# Patient Record
Sex: FEMALE | Race: WHITE
[De-identification: names, ages, dates, MRNs, and addresses within clinical notes are randomized per-mention and may not be internally consistent; named-entity substitution may affect disease eponyms.]

---

## 2019-04-12 ENCOUNTER — HOSPITAL ENCOUNTER (EMERGENCY)
Dept: HOSPITAL 89 - ER | Age: 19
Discharge: HOME | End: 2019-04-12
Payer: COMMERCIAL

## 2019-04-12 VITALS — SYSTOLIC BLOOD PRESSURE: 106 MMHG | DIASTOLIC BLOOD PRESSURE: 63 MMHG

## 2019-04-12 DIAGNOSIS — B34.9: Primary | ICD-10-CM

## 2019-04-12 LAB — PLATELET COUNT, AUTOMATED: 220 K/UL (ref 150–450)

## 2019-04-12 PROCEDURE — 82247 BILIRUBIN TOTAL: CPT

## 2019-04-12 PROCEDURE — 96375 TX/PRO/DX INJ NEW DRUG ADDON: CPT

## 2019-04-12 PROCEDURE — 86308 HETEROPHILE ANTIBODY SCREEN: CPT

## 2019-04-12 PROCEDURE — 82947 ASSAY GLUCOSE BLOOD QUANT: CPT

## 2019-04-12 PROCEDURE — 84075 ASSAY ALKALINE PHOSPHATASE: CPT

## 2019-04-12 PROCEDURE — 71046 X-RAY EXAM CHEST 2 VIEWS: CPT

## 2019-04-12 PROCEDURE — 84460 ALANINE AMINO (ALT) (SGPT): CPT

## 2019-04-12 PROCEDURE — 82040 ASSAY OF SERUM ALBUMIN: CPT

## 2019-04-12 PROCEDURE — 81025 URINE PREGNANCY TEST: CPT

## 2019-04-12 PROCEDURE — 99284 EMERGENCY DEPT VISIT MOD MDM: CPT

## 2019-04-12 PROCEDURE — 84295 ASSAY OF SERUM SODIUM: CPT

## 2019-04-12 PROCEDURE — 82374 ASSAY BLOOD CARBON DIOXIDE: CPT

## 2019-04-12 PROCEDURE — 82565 ASSAY OF CREATININE: CPT

## 2019-04-12 PROCEDURE — 85025 COMPLETE CBC W/AUTO DIFF WBC: CPT

## 2019-04-12 PROCEDURE — 96374 THER/PROPH/DIAG INJ IV PUSH: CPT

## 2019-04-12 PROCEDURE — 84520 ASSAY OF UREA NITROGEN: CPT

## 2019-04-12 PROCEDURE — 82310 ASSAY OF CALCIUM: CPT

## 2019-04-12 PROCEDURE — 84132 ASSAY OF SERUM POTASSIUM: CPT

## 2019-04-12 PROCEDURE — 96361 HYDRATE IV INFUSION ADD-ON: CPT

## 2019-04-12 PROCEDURE — 84155 ASSAY OF PROTEIN SERUM: CPT

## 2019-04-12 PROCEDURE — 84450 TRANSFERASE (AST) (SGOT): CPT

## 2019-04-12 PROCEDURE — 82435 ASSAY OF BLOOD CHLORIDE: CPT

## 2019-04-12 NOTE — RADIOLOGY IMAGING REPORT
[place] [facility] [place] 

 

PATIENT NAME: Makayla Navarro

: 2000

MR: [id number]

V: [id number]

EXAM DATE: 

ORDERING PHYSICIAN: ARA TORRES

TECHNOLOGIST: 

 

Location: [facility]

Patient: Makayla Navarro

: 2000

MRN: [id number]

Visit/Account:[id number]

Date of Sevice:  2019

 

ACCESSION #: [id number]

 

2 VIEWS CHEST

 

INDICATION: Shortness of breath with flulike symptoms.

 

COMPARISON: None available

 

FINDINGS:

The lungs are clear. No effusion or pneumothorax is seen. Heart size and mediastinal contours are nor
mal.

 

IMPRESSION:

1. No radiographic evidence of active disease.

 

Report Dictated By: David Drake at 2019 7:57 AM

 

Report E-Signed By: David Drake  at 2019 7:59 AM

 

WSN:[id number]

## 2019-04-12 NOTE — ER REPORT
History and Physical


Time Seen By MD:  06:15


 (ARA TORRES DO)


HPI/ROS


CHIEF COMPLAINT: Fever, general malaise, dyspnea





HISTORY OF PRESENT ILLNESS: Patient is a 18-year-old female here with complaints


of fever, general malaise, diagnosis of otitis media on amoxicillin. Patient 


also complains of sore throat, pain with swallowing, sensation of swelling in 


the proximal airway with associated dyspnea. Patient reports having decreased 


appetite, nausea now with vomiting. Patient reports having a negative flu test 


recently though she is presumptively being treated with Tamiflu. This morning 


patient reported increased swelling and pain of the throat. Patient is febrile, 


tachycardic at time of evaluation.





REVIEW OF SYSTEMS:


Constitutional: + fever, + chills.


Eyes: No discharge.


ENT: + sore throat, + odynophagia, + bilateral otalgia. 


Cardiovascular: No chest pain, no palpitations.


Respiratory: No cough, + shortness of breath.


Gastrointestinal: No abdominal pain, + nausea/ vomiting.


Genitourinary: No hematuria.


Musculoskeletal: No back pain.


Skin: No rashes.


Neurological: + headache.


 (ARA TORRES DO)


Allergies:  


Coded Allergies:  


     No Known Drug Allergies (Unverified , 4/12/19)


Home Meds


Reported Medications


[Birth Control Oral]   No Conflict Check


   4/12/19


Constitutional





Vital Sign - Last 24 Hours








 4/12/19 4/12/19 4/12/19 4/12/19





 06:11 06:15 06:17 06:30


 


Temp   100.7 


 


Pulse ???  125 


 


Resp   18 


 


B/P (MAP)  127/91 (103) 127/91 114/81 (92)


 


Pulse Ox   94 


 


O2 Delivery   Room Air 


 


    





 4/12/19 4/12/19 4/12/19 4/12/19





 06:41 06:56 07:00 07:11


 


Pulse 110 ???  ???


 


B/P (MAP)   ???/??? (1665) 


 


Pulse Ox 95   





 4/12/19 4/12/19 4/12/19 





 07:26 07:30 07:41 


 


Pulse 106  112 


 


B/P (MAP)  113/65 (81)  


 


Pulse Ox 95  94 














Intake and Output   


 


 4/11/19 4/11/19 4/12/19





 15:00 23:00 07:00


 


Intake Total   1000 ml


 


Balance   1000 ml





 (JOSEFINA PATTON MD)


Physical Exam


   General Appearance: The patient is alert, has no immediate need for airway 


protection and no signs of toxicity. Uncomfortable appearing


Eyes: Pupils equal and round no pallor or injection.


ENT, Mouth: + Erythema of the posterior oropharynx, + tender cervical adenopathy


Respiratory: There are no retractions, lungs are clear to auscultation. No rales


or rhonchi


Cardiovascular: + Sinus tachycardia


Gastrointestinal:  Abdomen is soft and non tender, no masses, bowel sounds 


normal.


Neurological: No focal neurological deficits


Skin: Warm and dry, no rashes.


Musculoskeletal:  Neck is supple + cervical tender adenopathy.


      Extremities are nontender, nonswollen and have full range of motion.





DIFFERENTIAL DIAGNOSIS: After history and physical exam differential diagnosis 


was considered for adult fever including but not limited to viral syndromes i


ncluding influenza, mononucleosis, pneumonia and sepsis, otitis media


 (ARA TORRES DO)





Medical Decision Making


Data Points


Result Diagram:  


4/12/19 0640                                                                    


           4/12/19 0640





Laboratory





Hematology








Test


 4/12/19


06:40 4/12/19


06:55


 


Red Blood Count


 5.05 M/uL


(4.17-5.56) 





 


Mean Corpuscular Volume


 81.9 fL


(80.0-96.0) 





 


Mean Corpuscular Hemoglobin


 27.2 pg


(26.0-33.0) 





 


Mean Corpuscular Hemoglobin


Concent 33.2 g/dL


(32.0-36.0) 





 


Red Cell Distribution Width


 13.9 %


(11.5-14.5) 





 


Mean Platelet Volume


 8.3 fL


(7.2-11.1) 





 


Neutrophils (%) (Auto)


 80.3 %


(39.4-72.5) 





 


Lymphocytes (%) (Auto)


 9.9 %


(17.6-49.6) 





 


Monocytes (%) (Auto)


 8.5 %


(4.1-12.4) 





 


Eosinophils (%) (Auto)


 1.0 %


(0.4-6.7) 





 


Basophils (%) (Auto)


 0.3 %


(0.3-1.4) 





 


Nucleated RBC Relative Count


(auto) 0.0 /100WBC 


 





 


Neutrophils # (Auto)


 11.0 K/uL


(2.0-7.4) 





 


Lymphocytes # (Auto)


 1.4 K/uL


(1.3-3.6) 





 


Monocytes # (Auto)


 1.2 K/uL


(0.3-1.0) 





 


Eosinophils # (Auto)


 0.1 K/uL


(0.0-0.5) 





 


Basophils # (Auto)


 0.0 K/uL


(0.0-0.1) 





 


Nucleated RBC Absolute Count


(auto) 0.00 K/uL 


 





 


Sodium Level


 135 mmol/L


(137-145) 





 


Potassium Level


 3.1 mmol/L


(3.5-5.0) 





 


Chloride Level


 104 mmol/L


() 





 


Carbon Dioxide Level


 25 mmol/L


(22-31) 





 


Blood Urea Nitrogen 8 mg/dl (7-18)  


 


Creatinine


 0.70 mg/dl


(0.52-1.04) 





 


Glomerular Filtration Rate


Calc > 60.0 


 





 


Random Glucose


 119 mg/dl


() 





 


Calcium Level


 8.4 mg/dl


(8.4-10.2) 





 


Total Bilirubin


 0.3 mg/dl


(0.2-1.3) 





 


Aspartate Amino Transf


(AST/SGOT) 18 U/L (0-35) 


 





 


Alanine Aminotransferase


(ALT/SGPT) 27 U/L (0-56) 


 





 


Alkaline Phosphatase 60 U/L (0-126)  


 


Total Protein


 6.8 g/dl


(6.3-8.2) 





 


Albumin


 3.8 g/dl


(3.5-5.0) 





 


Monoscreen


 Negative


(NEGATIVE) 





 


Urine HCG, Qualitative


 


 Negative


(NEGATIVE)








Chemistry








Test


 4/12/19


06:40 4/12/19


06:55


 


White Blood Count


 13.6 k/uL


(4.5-11.0) 





 


Red Blood Count


 5.05 M/uL


(4.17-5.56) 





 


Hemoglobin


 13.7 g/dL


(12.0-16.0) 





 


Hematocrit


 41.4 %


(34.0-47.0) 





 


Mean Corpuscular Volume


 81.9 fL


(80.0-96.0) 





 


Mean Corpuscular Hemoglobin


 27.2 pg


(26.0-33.0) 





 


Mean Corpuscular Hemoglobin


Concent 33.2 g/dL


(32.0-36.0) 





 


Red Cell Distribution Width


 13.9 %


(11.5-14.5) 





 


Platelet Count


 220 K/uL


(150-450) 





 


Mean Platelet Volume


 8.3 fL


(7.2-11.1) 





 


Neutrophils (%) (Auto)


 80.3 %


(39.4-72.5) 





 


Lymphocytes (%) (Auto)


 9.9 %


(17.6-49.6) 





 


Monocytes (%) (Auto)


 8.5 %


(4.1-12.4) 





 


Eosinophils (%) (Auto)


 1.0 %


(0.4-6.7) 





 


Basophils (%) (Auto)


 0.3 %


(0.3-1.4) 





 


Nucleated RBC Relative Count


(auto) 0.0 /100WBC 


 





 


Neutrophils # (Auto)


 11.0 K/uL


(2.0-7.4) 





 


Lymphocytes # (Auto)


 1.4 K/uL


(1.3-3.6) 





 


Monocytes # (Auto)


 1.2 K/uL


(0.3-1.0) 





 


Eosinophils # (Auto)


 0.1 K/uL


(0.0-0.5) 





 


Basophils # (Auto)


 0.0 K/uL


(0.0-0.1) 





 


Nucleated RBC Absolute Count


(auto) 0.00 K/uL 


 





 


Glomerular Filtration Rate


Calc > 60.0 


 





 


Calcium Level


 8.4 mg/dl


(8.4-10.2) 





 


Total Bilirubin


 0.3 mg/dl


(0.2-1.3) 





 


Aspartate Amino Transf


(AST/SGOT) 18 U/L (0-35) 


 





 


Alanine Aminotransferase


(ALT/SGPT) 27 U/L (0-56) 


 





 


Alkaline Phosphatase 60 U/L (0-126)  


 


Total Protein


 6.8 g/dl


(6.3-8.2) 





 


Albumin


 3.8 g/dl


(3.5-5.0) 





 


Monoscreen


 Negative


(NEGATIVE) 





 


Urine HCG, Qualitative


 


 Negative


(NEGATIVE)








Urinalysis








Test


 4/12/19


06:55


 


Urine HCG, Qualitative


 Negative


(NEGATIVE)





 (JOSEFINA PATTON MD)





ED Course/Re-evaluation


ED Course


Patient is a 18-year-old female here with complaints of nausea, vomiting, 


worsening odynophagia, recent diagnosis of otitis media on amoxicillin, 


presumptive diagnosis of influenza on Tamiflu. Patient is otherwise healthy at 


baseline. Patient is febrile, tachycardic, complains of decreased appetite, mild


dyspnea, throat swelling. Patient was given normal saline bolus, Toradol, 


Decadron, Zofran for symptom management. Patient had a previously negative 


influenza test, mono test was ordered as well as CBC, CMP. Chest x-ray was 


ordered. Patient was signed out to Dr. Josefina Patton pending results.


 (ARA TORRES DO)


ED Course


Symptoms improved. Taking PO. Afebrile. Will d/c with steroids for 4 days. Will 


continue with abx.


Decision to Disposition Date:  Apr 12, 2019


Decision to Disposition Time:  08:28


 (JOSEFINA PATTON MD)





Depart


Departure


Latest Vital Signs





Vital Signs








  Date Time  Temp Pulse Resp B/P (MAP) Pulse Ox O2 Delivery O2 Flow Rate FiO2


 


4/12/19 07:41  112   94   


 


4/12/19 07:30    113/65 (81)    


 


4/12/19 06:17 100.7  18   Room Air  





 (JOSEFINA PATTON MD)


Impression:  


   Primary Impression:  


   Viral syndrome


Condition:  Improved


Disposition:  HOME OR SELF-CARE


New Scripts


Prednisone (PREDNISONE) 20 Mg Tablet


40 MG PO QDAY for 4 Days, #8 TAB


   Prov: JOSEFINA PATTON MD         4/12/19


Patient Instructions:  Viral Syndrome (ED)











ARA TORRES DO           Apr 12, 2019 06:15


JOSEFINA PATTON MD                 Apr 12, 2019 08:31